# Patient Record
Sex: FEMALE | Race: OTHER | NOT HISPANIC OR LATINO
[De-identification: names, ages, dates, MRNs, and addresses within clinical notes are randomized per-mention and may not be internally consistent; named-entity substitution may affect disease eponyms.]

---

## 2022-07-07 PROBLEM — Z00.00 ENCOUNTER FOR PREVENTIVE HEALTH EXAMINATION: Status: ACTIVE | Noted: 2022-07-07

## 2022-07-14 ENCOUNTER — APPOINTMENT (OUTPATIENT)
Dept: ORTHOPEDIC SURGERY | Facility: CLINIC | Age: 62
End: 2022-07-14

## 2022-07-14 VITALS — HEIGHT: 62 IN | BODY MASS INDEX: 23.74 KG/M2 | WEIGHT: 129 LBS

## 2022-07-14 DIAGNOSIS — M79.641 PAIN IN RIGHT HAND: ICD-10-CM

## 2022-07-14 DIAGNOSIS — M65.341 TRIGGER FINGER, RIGHT RING FINGER: ICD-10-CM

## 2022-07-14 DIAGNOSIS — M25.531 PAIN IN RIGHT WRIST: ICD-10-CM

## 2022-07-14 PROCEDURE — 20550 NJX 1 TENDON SHEATH/LIGAMENT: CPT

## 2022-07-14 PROCEDURE — 73130 X-RAY EXAM OF HAND: CPT | Mod: RT

## 2022-07-14 PROCEDURE — 99202 OFFICE O/P NEW SF 15 MIN: CPT

## 2022-07-14 NOTE — PHYSICAL EXAM
[de-identified] :   Patient has full range of motion of the right wrist and hand.  It mild tenderness to palpation around the A1 pulley.  There is mild triggering present.  Id there is normal sensation normal capillary refill.  There is no instability.  No tenderness on the ulnar side of the wrist.  It negative Tinel's median nerve compression test no masses are palpable no instability at the DRUJ no tenderness along the hook of the hamate

## 2022-07-14 NOTE — ASSESSMENT
[FreeTextEntry1] :  Patient mainly has a right ring finger trigger digit.  Id were discussed an MRI of the right hand while is giving her the cortisone injection into the right ring finger she felt the same pain discomfort associated with her discomfort that she has in her hand so going to hold off on any MRI testing at this time.  Id she may need that in the future.  Um however we are giving her cortisone injection.  The injection does.

## 2022-07-14 NOTE — HISTORY OF PRESENT ILLNESS
[de-identified] : Right hand trigger finger as well as wrist pain is her complaints.  Comes in for evaluation today.  She has had trigger digit injections in the years past.  Some the wrist pain she having has pain discomfort and then bothers her more.  Bothers her more with activities such as playing tennis.  But sometimes get pain discomfort is radiating from the ulnar side of the wrist throughout the hand.

## 2022-07-14 NOTE — DATA REVIEWED
[FreeTextEntry1] :  Radiographs three views of the right wrist reviewed showing no fracture dislocation there is some calcification at the tip of the ulnar styloid

## 2022-07-14 NOTE — PROCEDURE
[FreeTextEntry3] : Trigger finger injection was performed because of pain inflammation and stiffness\par Anesthesia: ethyl chloride sprayed topically\par Dexamethasone injection of 1cc  4mg/ml\par Lidocaine: An injection of Lidocaine 1% 1cc\par \par Patient has tried OTC's including aspirin, Ibuprofen, Aleve etc or prescription NSAIDS, and/or exercises at home and/ or\par physical therapy without satisfactory response.\par After verbal consent using sterile preparation and technique. The risks, benefits, and alternatives to cortisone injection\par were explained in full to the patient. Risks outlined include but are not limited to infection, sepsis, bleeding, scarring, skin\par discoloration, temporary increase in pain, syncopal episode, failure to resolve symptoms, allergic reaction, symptom\par recurrence, and elevation of blood sugar in diabetics. Patient understood the risks. All questions were answered. After\par discussion of options, patient requested an injection. Oral informed consent was obtained and sterile prep was done of the\par injection site. Sterile technique was utilized for the procedure including the preparation of the solutions used for the\par injection. Patient tolerated the procedure well. Advised to ice the injection site this evening.\par Prep with betadine locally to site. Sterile technique used\par \par right RING finger

## 2022-08-22 ENCOUNTER — APPOINTMENT (OUTPATIENT)
Dept: ORTHOPEDIC SURGERY | Facility: CLINIC | Age: 62
End: 2022-08-22